# Patient Record
Sex: MALE | Race: WHITE | NOT HISPANIC OR LATINO | Employment: STUDENT | URBAN - METROPOLITAN AREA
[De-identification: names, ages, dates, MRNs, and addresses within clinical notes are randomized per-mention and may not be internally consistent; named-entity substitution may affect disease eponyms.]

---
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
## 2024-03-06 ENCOUNTER — TELEPHONE (OUTPATIENT)
Dept: PRIMARY CARE CLINIC | Facility: CLINIC | Age: 19
End: 2024-03-06

## 2024-03-06 NOTE — TELEPHONE ENCOUNTER
----- Message from Tricia Sam sent at 3/6/2024  8:39 AM CST -----  Regarding: sooner sharonda  .Type:  Sooner Apoointment Request    Caller is requesting a sooner appointment.  Caller declined first available appointment listed below.  Caller will not accept being placed on the waitlist and is requesting a message be sent to doctor.  Name of Caller:Pt    When is the first available appointment?03/25/2024    Symptoms:penile discomfort/pain.    Would the patient rather a call back or a response via [facility]?     Best Call Back Number:[phone]    Additional Information: Pt would like to see the doctor, unfortunately, he has no openings for this week; pt states that he fears that if he waits that long, it'll become a bigger issue; please advise. Thanks.

## 2025-03-11 NOTE — PROGRESS NOTES
"Subjective:       Patient ID: Eleazar Sandoval is a 19 y.o. male.    Chief Complaint:  Testicular issue, skin cyst.    Established patient, presents to the clinic with a few issues.  He had some testicular issues, did see a urologist, had an ultrasound done, was told that he had testicular cyst(s).  Other findings, was told that nothing needs to be done.    Was told that they could not rule out a herniation, he does not have inguinal bulge or any other signs or symptoms that would suggest hernia.  Does have a small cyst, upper back that he would like to have looked at, does not wish for anything to be done if it is not necessary.      Review of Systems   Constitutional: Negative.  Negative for fatigue and fever.   HENT: Negative.  Negative for sore throat and trouble swallowing.    Eyes: Negative.  Negative for redness and visual disturbance.   Respiratory: Negative.  Negative for chest tightness and shortness of breath.    Cardiovascular: Negative.  Negative for chest pain.   Gastrointestinal: Negative.  Negative for abdominal pain, blood in stool and nausea.   Endocrine: Negative.  Negative for cold intolerance, heat intolerance and polyuria.   Genitourinary: Negative.    Musculoskeletal: Negative.  Negative for arthralgias, gait problem and joint swelling.   Integumentary:  Negative for rash.        Cyst   Neurological: Negative.  Negative for dizziness, weakness and headaches.   Psychiatric/Behavioral: Negative.  Negative for agitation and dysphoric mood.            Patient Care Team:  Alexander Pinzon MD as PCP - General (Family Medicine)  Margot Paul MD as Consulting Physician (Cardiology)  Tera Hanley MD as Consulting Physician (Pediatrics)  Glen Caldera APRN (Psychiatry)  Objective:     Vitals:    03/12/25 0857   BP: 114/63   Pulse: 80   Resp: 18   SpO2: 99%   Weight: 78.5 kg (173 lb)   Height: 5' 4" (1.626 m)   Body mass index is 29.7 kg/m².     Physical Exam  Constitutional:       Appearance: " Normal appearance.   Eyes:      Conjunctiva/sclera: Conjunctivae normal.   Cardiovascular:      Rate and Rhythm: Normal rate and regular rhythm.   Pulmonary:      Effort: Pulmonary effort is normal.      Breath sounds: Normal breath sounds.   Genitourinary:     Comments: Genital exam deferred  Skin:     General: Skin is warm and dry.      Comments: Small noninflamed 1 cm cutaneous cyst, upper back   Neurological:      Mental Status: He is alert.   Psychiatric:         Mood and Affect: Mood normal.         Behavior: Behavior normal.           Assessment:       ICD-10-CM ICD-9-CM   1. Pain in right testicle  N50.811 608.9   2. Epidermal cyst  L72.0 706.2       No current outpatient medications    Plan:     Problem List Items Addressed This Visit          Derm    Epidermal cyst    Overview   Small subcutaneous cyst, upper thoracic region, we will contact the clinic if he desires referral to have this removed.            Renal/    Pain in right testicle - Primary    Overview   Patient has had imaging done, has testicular cysts, no other abnormalities, no signs of herniation.  No need for further workup.                    Follow up for next appointment as scheduled or as needed.    This note was created with the assistance of [email or web address] voice recognition software or phone dictation. There may be transcription errors as a result of using this technology. Effort has been made to assure accuracy of transcription but any obvious errors or omissions should be clarified with the author of the document.

## 2025-03-12 ENCOUNTER — OFFICE VISIT (OUTPATIENT)
Dept: PRIMARY CARE CLINIC | Facility: CLINIC | Age: 20
End: 2025-03-12
Payer: COMMERCIAL

## 2025-03-12 VITALS
RESPIRATION RATE: 18 BRPM | WEIGHT: 173 LBS | SYSTOLIC BLOOD PRESSURE: 114 MMHG | HEIGHT: 64 IN | HEART RATE: 80 BPM | BODY MASS INDEX: 29.53 KG/M2 | DIASTOLIC BLOOD PRESSURE: 63 MMHG | OXYGEN SATURATION: 99 %

## 2025-03-12 DIAGNOSIS — L72.0 EPIDERMAL CYST: ICD-10-CM

## 2025-03-12 DIAGNOSIS — N50.811 PAIN IN RIGHT TESTICLE: Primary | ICD-10-CM
